# Patient Record
(demographics unavailable — no encounter records)

---

## 2024-11-18 NOTE — COUNSELING
[Fall prevention counseling provided] : Fall prevention counseling provided [Adequate lighting] : Adequate lighting [No throw rugs] : No throw rugs [Use proper foot wear] : Use proper foot wear [Sleep ___ hours/day] : Sleep [unfilled] hours/day [Engage in a relaxing activity] : Engage in a relaxing activity [Plan in advance] : Plan in advance [FreeTextEntry2] : bmi 23  weight 130

## 2024-11-18 NOTE — HEALTH RISK ASSESSMENT
[Very Good] : ~his/her~  mood as very good [No] : In the past 12 months have you used drugs other than those required for medical reasons? No [No falls in past year] : Patient reported no falls in the past year [Little interest or pleasure doing things] : 1) Little interest or pleasure doing things [Feeling down, depressed, or hopeless] : 2) Feeling down, depressed, or hopeless [0] : 2) Feeling down, depressed, or hopeless: Not at all (0) [PHQ-2 Negative - No further assessment needed] : PHQ-2 Negative - No further assessment needed [Former] : Former [NO] : No [Patient reported bone density results were normal] : Patient reported bone density results were normal [Patient declined colonoscopy] : Patient declined colonoscopy [HIV test declined] : HIV test declined [Hepatitis C test offered] : Hepatitis C test offered [Change in mental status noted] : Change in mental status noted [Language] : difficulty with language [None] : None [Alone] : lives alone [Retired] : retired [College] : College [] :  [# Of Children ___] : has [unfilled] children [Feels Safe at Home] : Feels safe at home [Fully functional (bathing, dressing, toileting, transferring, walking, feeding)] : Fully functional (bathing, dressing, toileting, transferring, walking, feeding) [Reports changes in hearing] : Reports changes in hearing [Smoke Detector] : smoke detector [Carbon Monoxide Detector] : carbon monoxide detector [Safety elements used in home] : safety elements used in home [Seat Belt] :  uses seat belt [Sunscreen] : uses sunscreen [FreeTextEntry1] : none  [de-identified] : exercises twice a week  [de-identified] : healthy diet   takes eunsure  when she doesnt have a meal  [[id number]] : 0 [de-identified] : stopped over 50 yrs ago  [Behavior] : denies difficulty with behavior [Learning/Retaining New Information] : denies difficulty learning/retaining new information [Handling Complex Tasks] : denies difficulty handling complex tasks [Reasoning] : denies difficulty with reasoning [Spatial Ability and Orientation] : denies difficulty with spatial ability and orientation [Sexually Active] : not sexually active [Reports changes in vision] : Reports no changes in vision [Reports changes in dental health] : Reports no changes in dental health [Travel to Developing Areas] : does not  travel to developing areas [TB Exposure] : is not being exposed to tuberculosis [Caregiver Concerns] : does not have caregiver concerns [BoneDensityDate] : 11/29/23 [de-identified] : decreased  [de-identified] : some college [de-identified] : needs help with  laundry house keeping  [de-identified] : wears hearing aide  [de-identified] : last eye exam this month  [de-identified] : last dental  few days ago  [AdvancecareDate] : 11/18/24

## 2024-11-18 NOTE — ASSESSMENT
[FreeTextEntry1] : health She needs flu vaccine covid vaccine   and is up to date with dental, eye exams bone density .  2 bmi 23 healthy eating  take ensure   3. dm  ---The following has been discussed:--- -Targets for weight and HgA1c have been discussed with patient  -FS goals have been reviewed with the patient in detail: AM <130 post meal<160-180 -Diet and weight goals have been discussed with the patient in detail. -The importance of exercise in the treatment of diabetes has been discussed  with the patient in detail. -Extensive dietary advice provided to patient and the need to avoid concentrated  sweets/simple carbohydrates and to ensure to consume protein with each meal.  -Patient instructed to limit carbohydrates to 60 gms per meal and 15 gms per  snacks.  -Patient to keep a blood sugar log to check fasting and before meals -Patient instructed on importance of daily feet inspection and to reports any  open lesions to physician promptly A diet that includes carbohydrates from fruits, vegetables, whole grains, legumes, and low-fat milk is encouraged. People with diabetes are advised to avoid sugar-sweetened beverages (including fruit juice).   The ideal amount of carbohydrate intake is uncertain. However, it's important for people with diabetes to monitor carbohydrate intake in order to manage their blood sugar levels and adjust insulin dosing as needed. (See 'Carbohydrate counting' above.)   ?In general, a variety of eating patterns (low fat, low carbohydrate, Mediterranean, vegetarian) are acceptable. Eating a healthy diet that contains a lot of the foods you like will make it easier to stick to your plan. However, you should talk to your health care provider before starting any diet that involves extreme restriction (such as a very low carb or "keto" diet). Depending on your situation, some diets may not be recommended.   ?The type of fat consumed appears to be more important than the amount of total fat. Saturated fats (eg, in meats, cheese, ice cream) can be replaced with monounsaturated and polyunsaturated fatty acids (eg, in fish, olive oil, nuts). Trans fatty acid consumption should be kept as low as possible. Trans fats are banned from processed foods in the [place]. Although very small amounts of trans fats are naturally present in meats, poultry and dairy products, the amount is too small for concern.   As diabetes increases your risk of heart disease and stroke, eating a diet low in saturated and trans fats and cholesterol can help to reduce your cholesterol levels and decrease these risks.   ?A dietitian can help you to determine how much protein your diet should include. In general, it's a good idea to get protein from lean meats, fish eggs, beans, soy, and nuts, and to limit the amount of red meat you eat.   ?Eating a diet that is high in fiber may help to keep your blood sugar levels under control.  ?A diet that is low in sodium and high in fruits, vegetables, and low-fat dairy products can help keep blood pressure under control.   ?Artificial sweeteners do not affect blood glucose levels and may be consumed in moderation. If you consume sugar-sweetened beverages regularly, a beverage containing artificial sweeteners (such as diet soda) can be a good short-term replacement strategy. However, the best approach is to avoid both sugar-sweetened and artificially sweetened beverages, and try to drink more water.   ?In the past, people with diabetes were told to avoid all foods with added sugar. This is no longer recommended, although it's important to limit sugar intake. If you take insulin, you should calculate each pre-meal dose based upon the total number of carbohydrates in the food, which includes the sugar content. (See 'Carbohydrate counting' above.)   ?Products that are "sugar-free" or "fat-free" do not necessarily have a reduced number of calories or carbohydrates. Read all nutrition labels carefully and compare with other similar products to determine which has the best balance of serving size and number of calories, carbohydrates, fat, and fiber.   Some sugar-free foods, such as sugar-free gelatin and sugar-free gum, do not have a significant number of calories or carbohydrates and are considered "free foods." Any food that has less than 20 calories and 5 grams of carbohydrate is considered a free food, meaning that it does not affect body weight or require an adjustment to your medication. 4. hypothyroid  check tsh  her heart rate is elevated  5 hld  to lower  LDL and non HDL  cholesterol levels     1 limit your intake of foods full of saturated fats  , trans fats, and dietary cholesterol .  Food with a lot of saturated fate include butter, fatty flesh like red meat, full fat and low fat dairy  products  , palm oil and coconut oil .   If you see partially hydrogenated fat in the ingredient  list of food label that food has trans fats.  Top sources of dietary chol include egg yolks , organ meats and shell fish.  one Type of fat omega 3 Fatty acids has been shown to protect against heart disease  . Good sources are cold water fish like salmon, mackerel , halibut ., trout  herring and sardines.     Limit  your intake of meat , poultry and fish to no more than 3.5  ounces per day     Eat a lot more fiber rich foods  like beans , oats, barley fruits and vegetables   .  Food naturally rich in soluble fiber  are good at lowering cholesterol .    Excellent choices  are  oats , oat bran , barley , peas , yams sweet potatoes and legumes  or beans .  Good fruit sources are berries passion fruit, oranges pears,, apricots , apples  and nectarines  .    choose protein rich plant foods   such as legumes or beans nuts and seeds over meat.     Lose as much weight as possible and exercise   Take plant sterol supplements   .A Daily intake  of 1-2 gms  of plant sterols  lowers ldl .    Best choice is supplements  such as Cholestoff  by natures made   because they dont have calories  sugar trans fats   an salt  of many foods enriched with plant sterols.    Take  Metamucil or psyllium   .   Studies have shown 9-10 gms as daily of psyllium   the equivalent of  about  3 teaspoons  of sugar free Metamucil   reduced LDL levels  . 6. diastolic dysfunciton refer to [facility] for fu  7 subarachnoid hemorrhagia  no recurrent neurological changes noted    8. hematuria  .fu ua   9 fall precaution Make your home safer  To avoid falling at home, get rid of things that might make you trip or slip. This might include furniture, electrical cords, clutter, and loose rugs Keep your home well-lit so that you can easily see where you are going. Avoid storing things in high places so you don't have to reach or climb. ?Wear sturdy shoes that fit well  Wearing shoes with high heels or slippery soles, or shoes that are too loose, can lead to falls. Walking around in bare feet, or only socks, can also increase your risk of falling. ?Take vitamin D pills  Taking vitamin D might lower the risk of falls in older people. This is because vitamin D helps make bones and muscles stronger. Your doctor can talk to you about whether you should take extra vitamin D, and how much. ?Stay active  Exercising on a regular basis can help lower your risk of falling. It might also help prevent you from getting hurt if you do fall. It is best to do a few different activities that help with both strength and balance. There are many kinds of exercise that can be safe for older people. These include walking, swimming, and Tom Chi (a Chinese martial art that involves slow, gentle movements). ?Use a cane, walker, and other safety devices  If your doctor recommends that you use a cane or walker, be sure that it's the right size and you know how to use it. There are other devices that might help you avoid falling, too. These include grab bars or a sturdy seat for the shower, non-slip bath mats, and hand rails or treads for the stairs (to prevent slipping). If you worry that you could fall, there are also alarm buttons that let you call for help if you fall and can't get up. start physical therapy.

## 2024-11-18 NOTE — PHYSICAL EXAM
[No Acute Distress] : no acute distress [Well Developed] : well developed [Well Nourished] : well nourished [PERRL] : pupils were equal in size, round, and reactive to light [Normal Oropharynx] : the oropharynx was normal [Normal Nasal Mucosa] : the nasal mucosa was normal [Normal Appearance] : was normal in appearance [None] : there were no thyroid nodules [JVP Elevated ___cm] : the JVP was not elevated [Rate ___] : at [unfilled] breaths per minute [Normal Rhythm/Effort] : normal respiratory rhythm and effort [Clear Bilaterally] : the lungs were clear to auscultation bilaterally [Normal to Percussion] : the lungs were normal to percussion [5th Left ICS - MCL] : palpated at the 5th LICS in the midclavicular line [Heart Rate ___] : [unfilled] bpm [Normal Rate] : normal [Normal S1] : normal S1 [Normal S2] : normal S2 [S3] : no S3 [S4] : no S4 [III] : a grade 3 [No Pitting Edema] : no pitting edema present [Rt] : no varicose veins of the right leg [Lt] : no varicose veins of the left leg [Right Carotid Bruit] : no bruit heard over the right carotid [Left Carotid Bruit] : no bruit heard over the left carotid [Right Femoral Bruit] : no bruit heard over the right femoral artery [Left Femoral Bruit] : no bruit heard over the left femoral artery [2+] : left 2+ [No Abnormalities] : the abdominal aorta was not enlarged and no bruit was heard [Bruit] : no bruit heard [Examination Of The Breasts] : a normal appearance [No Discharge] : no discharge [Soft, Nontender] : the abdomen was soft and nontender [No Mass] : no masses were palpated [No HSM] : no hepatosplenomegaly noted [Postauricular Lymph Nodes Enlarged Bilaterally] : nodes not enlarged [Preauricular Lymph Nodes Enlarged Bilaterally] : nodes not enlarged [Submandibular Lymph Nodes Enlarged Bilaterally] : nodes not enlarged [Suboccipital Lymph Nodes Enlarged Bilaterally] : nodes not enlarged [Submental Lymph Nodes Enlarged] : nodes not enlarged [Cervical Lymph Nodes Enlarged Posterior Bilaterally] : nodes not enlarged [Cervical Lymph Nodes Enlarged Anterior Bilaterally] : nodes not enlarged [Supraclavicular Lymph Nodes Enlarged Bilaterally] : nodes not enlarged [Axillary Lymph Nodes Enlarged Bilaterally] : nodes not enlarged [Epitrochlear Lymph Nodes Enlarged Bilaterally] : nodes not enlarged [Femoral Lymph Nodes Enlarged Bilaterally] : nodes not enlarged [Inguinal Lymph Nodes Enlarged Bilaterally] : nodes not enlarged [No Lymphangitis] : no lymphangitis observed [No Scoliosis] : no scoliosis [No Visual Abnormalities] : no visible abnormalities [Normal Lordosis] : normal lordosis [No Tenderness to Palpation] : no spine tenderness on palpation [No Masses] : no masses [Full ROM] : full ROM [No Pain with ROM] : no pain with motion in any direction [Intact] : all reflexes within normal limits bilaterally [Motor Strength Lower Extremities Left] : there was weakness of the left lower extremity [Normal Motor Tone] : the muscle tone was normal [Normal Scalp] : inspection of the scalp showed no abnormalities [Complexion Dark] : dark complexion [Tattoo - Single] : no tattoos observed [Limited Balance] : the patient's balance was impaired [Normal] : the deep tendon reflexes were normal [Normal Mental Status] : the patient's orientation, memory, attention, language and fund of knowledge were normal [Appropriate] : appropriate [Impaired judgment] : intact judgment [Impaired Insight] : intact insight [Comprehensive Foot Exam Normal] : Right and left foot were examined and both feet are normal. No ulcers in either foot. Toes are normal and with full ROM.  Normal tactile sensation with monofilament testing throughout both feet [de-identified] : front left lower tooth loose  gum recession and plque build up .  tongue normal   bilateral hearing aides  and removed for  evaluation.

## 2024-11-18 NOTE — HISTORY OF PRESENT ILLNESS
[FreeTextEntry1] : cpe  [de-identified] : Pt is a 86 yr  old woman with hld, hpn, glaucoma hypothyroid, dm left hip carola-prosthetic femur fracture, s/p ORIF on 8/2/19.  and arthritis who is here for her cpe.  Pt has hx of  left eye ptosis  due to  cerebral aneurysm and sp coiling in 2005.  she didnt bring in her medications.  I saw she has two rx for hctz one for 12.5 and one 25mg and is not sure which wone she takes.   she is taking her bp medication.   She -denies any headaches, nausea, vomiting, fever, chills, sweats, chest pain, chest pressure, diarrhea, constipation, dysphagia, sour taste in the mouth, dizziness, leg swelling, leg pain, myalgias, arthralgias, itchy eyes, itchy ears, heartburn, or reflux. She recently saw neuroligst and was advised to use her cane for walking and go to rehab for gait and balance .

## 2024-11-18 NOTE — PLAN
[FreeTextEntry1] : recommendations. pt states she checks her bp at home and its normal I have asked her to check 4 times a day chart and bring int to me  after 3weeks.

## 2025-03-27 NOTE — ASSESSMENT
[FreeTextEntry1] : 1 falls  Make your home safer  To avoid falling at home, get rid of things that might make you trip or slip. This might include furniture, electrical cords, clutter, and loose rugs Keep your home well-lit so that you can easily see where you are going. Avoid storing things in high places so you don't have to reach or climb. ?Wear sturdy shoes that fit well  Wearing shoes with high heels or slippery soles, or shoes that are too loose, can lead to falls. Walking around in bare feet, or only socks, can also increase your risk of falling. ?Take vitamin D pills  Taking vitamin D might lower the risk of falls in older people. This is because vitamin D helps make bones and muscles stronger. Your doctor can talk to you about whether you should take extra vitamin D, and how much. ?Stay active  Exercising on a regular basis can help lower your risk of falling. It might also help prevent you from getting hurt if you do fall. It is best to do a few different activities that help with both strength and balance. There are many kinds of exercise that can be safe for older people. These include walking, swimming, and Tom Chi (a Chinese martial art that involves slow, gentle movements). ?Use a cane, walker, and other safety devices  If your doctor recommends that you use a cane or walker, be sure that it's the right size and you know how to use it. There are other devices that might help you avoid falling, too. These include grab bars or a sturdy seat for the shower, non-slip bath mats, and hand rails or treads for the stairs (to prevent slipping). If you worry that you could fall, there are also alarm buttons that let you call for help if you fall and can't get up. 2 hpn    DIETARY SALT (SODIUM); DASH DIET AND BLOOD PRESSURE: To decrease the sodium in your diet:   Use fresh vegetables and foods as much as possible.  Avoid canned and processed foods. Cured meats such as son, ham, and sausages are high in salt.  Try using different herbs and spices in your cooking instead of salt.  In restaurants, avoid foods with sauces, cheese, and cured meats. Ask for low-sodium choices. To get more potassium in your diet, eat:  Bananas, fresh or dried apricots, peaches, citrus fruits, melons  Cauliflower, broccoli, tomatoes, carrots, raw spinach, beet greens, potatoes To get more magnesium in your diet, eat:  Whole grain foods, leafy green vegetables, dried fruits  Fish and seafood, poultry  To get more calcium in your diet, eat:  Nonfat milk, yogurt, and low-fat cheeses   [place] and sardines  Cooked dried beans  Broccoli, kale, and bok bob  Tofu or soybean curd DASH stands for "dietary approaches to stop hypertension." The DASH diet is low in total and saturated fat. It is rich in fruits, vegetables, and low-fat dairy foods. The diet allows you to get natural fiber, calcium, and magnesium from food. It prevents or lowers high blood pressure. It can also help lower cholesterol in your blood.  Don't change how you eat all at once. It's much more likely that you'll succeed if you make only one or two small changes at a time. Wait until those changes are a habit, then make a couple more changes. Some good starting steps include:  Add one serving of vegetables to your meals at lunch and dinner. This is an easy way to help you get more vegetables in your diet.  Have a piece of fruit as an afternoon or after-dinner snack. One glass of juice at breakfast is not enough fruit in your diet.  Use half your usual amount of butter, margarine, or salad dressing.  Buy nonfat salad dressing or nonfat sour cream. Follow this guide to select your menu of meals. The number of calories we want you to eat each day will tell you how many servings you can choose from each food group. Calories: 1600 2100 2600 3100 Servings Grains 6 7  10  12  Vegetables 	 4 4  5 6 Fruit 4 5 5 6 Dairy (low-fat) 2  3 3 3  Meat, poultry, fish  1  2 2  Nuts, seeds    1 Fats and sweets 1  2  3 4 Grains and grain products like breads and cereals provide energy, fiber and vitamins. Whole grains have more of these nutrients. One serving equals one of the following: Bagel, 1/2 medium; Barley, cooked 1/2 cup; Biscuit, country style 1 medium; Bread, whole wheat, white 1 slice; Cereals, cold or cooked, 1/2 cup; Cornbread, 1 medium piece; Crackers, phuong, 2; Crackers, saltine, 4; Dinner roll, 1medium; English muffin, ; Hamburger bun, ; Muffin, 1 medium; Pancake, 1 medium; Pasta, 1/2 cup cooked; Nga, 1/2 large or 1 small; Popcorn, 1 cup popped; Pretzels, 1 ounce; Rice, white, brown, or wild, 1/2 cup cooked; Tortillas, corn or flour, 1 medium; Waffle, 1 medium; Wheat germ, 1/4 cup;  Vegetables are rich sources of potassium, magnesium, and fiber. One serving is 1/2 cup of any of the following cooked vegetables: Asparagus, Beans (green, yellow), Beets, Broccoli, [place] Sprouts, Carrots, Cauliflower, [place], chicory, mustard and turnip (and other) greens, Corn, Kale, Lima beans, Mixed vegetables, Okra, Parsnips, Peas, green, Potatoes (1/2 medium or 1/2 cup mashed), Pumpkin, Rutabaga, Spaghetti or tomato sauce, Spinach, Squash (zucchini or yellow), Stewed tomatoes, Su[place]ot[place], Sweet potatoes, Turnips, Yam  Raw vegetables: Carrots,1/2 cup; Celery, 1/2 cup; Lettuce (alban, loose-leaf, green-leaf), 1 cup; Peppers, 1/2 cup; Spinach, 1 cup; Tomato, 1/2 Fruits and fruit juices are important sources of potassium and magnesium. Fruits also contain fiber and are low in sodium and fat. One serving equals: Any fruit juice, # cup (6 ounces); Canned or frozen fruit,  cup (includes applesauce); Dried fruit,  cup;  Fresh fruit: Apple, 1 medium; Apricots, 2 medium; Banana, 1 medium; Berries, 1/2 cup; Melon, 1 wedge, or 1/2 cup; Cherries, 10; Grapefruit, 1/2; Grapes, 15; Kiwi, 1 medium; [place], 1/2 small; Nectarine, 1 medium; Orange, 1 medium; Peach, 1 medium; Pear, 1 medium; Pineapple, 1/2 cup; Plums, 2 medium; Tangerine, 1 large Dairy foods provide protein and calcium. Use low-fat or nonfat dairy products to cut down on fat. One serving equals: Skim milk, 1 cup (8 ounces); 1% low fat milk, 1 cup (8 ounces); 2% low fat milk, 1 cup (8 ounces) nonfat dry milk powder (1/3 cup); Low-fat cottage cheese, 1 cup (8 ounces); Parmesan cheese, 1 tablespoon; Mozzarella cheese, part skim, 1/4 cup (1 ounce); Low-fat cheddar cheese, 11/2 ounces; Ricotta cheese, part skim milk or nonfat, 1/4 cup (11/2 ounces); Other low fat or nonfat cheeses (11/2 oz.); Low-fat or nonfat yogurt, fruit-flavored or plain, 1 cup (8 ounces) Low-fat or nonfat frozen yogurt, 1/2 cup (4 ounces); Note: People who can't digest dairy products can try taking lactase enzyme pills or drops (available at drug and grocery stores) when they eat dairy. There is also milk available with the enzyme already added. Or you can buy lactose-free milk. Meat, poultry, and fish are good sources of protein and magnesium. One serving equals: Lean meat including beef, veal, or pork, 3 ounces cooked; Skinless, white meat poultry including turkey, chicken, 3 ounces; Fish and shellfish, 3 ounces cooked; Low-fat luncheon meats, 1 ounce; Egg, 1 medium; Tofu, 3 ounces Note: Three ounces of cooked meat is about the size of a deck of cards. Nuts, seeds, and legumes are rich sources of energy, magnesium, potassium, protein and fiber. Nuts and seeds are also high in fat, so portions should be small. Almonds, 1/3 cup; Beans such as kidney, ruiz, and navy, 1/2 cup cooked; Chickpeas and lentils, 1/2 cup cooked; Cashews, 1/3 cup; Filberts, 1/3 cup; Mixed nuts, 1/3 cup; Peanut butter, 2 tablespoons; Peanuts, 1/3 cup; Sesame seeds, 2 tablespoons; Sunflower seeds, 2 tablespoons; Tofu, regular, 3 ounces; Walnuts, 1/3 cup  Following the above diet will give you about 27% fat in your diet. The goal is to have 30% or less of the calories you eat each day be from fat. To meet that goal, do not eat more than 2-3 servings daily of added fat. Also try to limit sweets. One serving equals: Butter or margarine, 1 teaspoon; Mayonnaise, 1 teaspoon; Low-fat mayonnaise, 1 tablespoon; Salad dressing, 1 tablespoon; Low-fat salad dressing, 2 tablespoons; Oil, 1 teaspoon (use olive, canola, safflower, or other vegetable oils); Candy, hard, 3 pieces; Jelly or jam, 1 tablespoon); Jell-O, 1/2 cup; Jelly beans, 1/2 ounce; Maple syrup, 1 tablespoon; Popsicle, 1; Sherbet or nonfat or low-fat frozen yogurt, 1/2 cup; Sugar, 1 tablespoon; Sugared lemonade or fruit punch, 1 cup (8 ounces); Note: Try diet fruit-flavored gelatin or frozen, canned, or fresh fruit for dessert.  Small amounts of alcohol may have benefits to the heart and blood pressure. However, excess use of alcohol can cause damage to the brain, liver and other organs. It can lead to high blood pressure. Drinking more than two drinks (15 ml) every day can raise your blood pressure. 15 ml of alcohol equals:   one 12-ounce bottle of beer   a half glass (5 ounces) of wine   1 ounce (one shot) of 100 proof hard liquor 3 hld  to lower  LDL and non HDL  cholesterol levels     1 limit your intake of foods full of saturated fats  , trans fats, and dietary cholesterol .  Food with a lot of saturated fate include butter, fatty flesh like red meat, full fat and low fat dairy  products  , palm oil and coconut oil .   If you see partially hydrogenated fat in the ingredient  list of food label that food has trans fats.  Top sources of dietary chol include egg yolks , organ meats and shell fish.  one Type of fat omega 3 Fatty acids has been shown to protect against heart disease  . Good sources are cold water fish like salmon, mackerel , halibut ., trout  herring and sardines.     Limit  your intake of meat , poultry and fish to no more than 3.5  ounces per day     Eat a lot more fiber rich foods  like beans , oats, barley fruits and vegetables   .  Food naturally rich in soluble fiber  are good at lowering cholesterol .    Excellent choices  are  oats , oat bran , barley , peas , yams sweet potatoes and legumes  or beans .  Good fruit sources are berries passion fruit, oranges pears,, apricots , apples  and nectarines  .    choose protein rich plant foods   such as legumes or beans nuts and seeds over meat.     Lose as much weight as possible and exercise   Take plant sterol supplements   .A Daily intake  of 1-2 gms  of plant sterols  lowers ldl .    Best choice is supplements  such as Cholestoff  by natures made   because they dont have calories  sugar trans fats   an salt  of many foods enriched with plant sterols.    Take  Metamucil or psyllium   .   Studies have shown 9-10 gms as daily of psyllium   the equivalent of  about  3 teaspoons  of sugar free Metamucil   reduced LDL levels  . chol  213 ldl 127 not optimal  4 dm  hgbA1c was 5.9  well controlled  ---The following has been discussed:--- -Targets for weight and HgA1c have been discussed with patient  -FS goals have been reviewed with the patient in detail: AM <130 post meal<160-180 -Diet and weight goals have been discussed with the patient in detail. -The importance of exercise in the treatment of diabetes has been discussed  with the patient in detail. -Extensive dietary advice provided to patient and the need to avoid concentrated  sweets/simple carbohydrates and to ensure to consume protein with each meal.  -Patient instructed to limit carbohydrates to 60 gms per meal and 15 gms per  snacks.  -Patient to keep a blood sugar log to check fasting and before meals -Patient instructed on importance of daily feet inspection and to reports any  open lesions to physician promptly 5 hypothyroid check her levels  6 diastolic dysfunction  fu with [facility]  7 sp subarachonoid hemorrhage  doing well   8 elevated alk phos repeat    9  urinary incontinence  check ua. she states she gets up twice at night to urinate no dyspuria

## 2025-03-27 NOTE — PHYSICAL EXAM
[No Acute Distress] : no acute distress [Normal Sclera/Conjunctiva] : normal sclera/conjunctiva [Normal Oropharynx] : the oropharynx was normal [Supple] : supple [Normal Rate] : normal rate  [Regular Rhythm] : with a regular rhythm [Normal S1, S2] : normal S1 and S2 [No Edema] : there was no peripheral edema [No Extremity Clubbing/Cyanosis] : no extremity clubbing/cyanosis [Normal Posterior Cervical Nodes] : no posterior cervical lymphadenopathy [Normal Anterior Cervical Nodes] : no anterior cervical lymphadenopathy [No CVA Tenderness] : no CVA  tenderness [Normal] : affect was normal and insight and judgment were intact [de-identified] : ptosis on left eye

## 2025-03-27 NOTE — HEALTH RISK ASSESSMENT
[No] : In the past 12 months have you used drugs other than those required for medical reasons? No [One fall no injury in past year] : Patient reported one fall in the past year without injury [Little interest or pleasure doing things] : 1) Little interest or pleasure doing things [Feeling down, depressed, or hopeless] : 2) Feeling down, depressed, or hopeless [0] : 2) Feeling down, depressed, or hopeless: Not at all (0) [PHQ-2 Negative - No further assessment needed] : PHQ-2 Negative - No further assessment needed [Former] : Former [de-identified] : [facility]  and er visist 3/10/25 [de-identified] : walks  [de-identified] : reg [[id number]] : 0 [de-identified] : stopped 70 yrs ago

## 2025-03-27 NOTE — HISTORY OF PRESENT ILLNESS
[FreeTextEntry1] : fu  [de-identified] : Pt is a 86 yr old woman who is here for fu after a fall.  She  has  hld, hpn, glaucoma hypothyroid, dm left hip carola-prosthetic femur fracture, s/p ORIF on 8/2/19. and arthritis .  She states her elevator was broken and had to walk down stairs  and and she missed a step and fell 4-5 steps  and didnt hit her head or lose consciousness and was taken to er by ambulance  [facility]. and took xrays and was negative and discharged.  She is feeling well  -denies any headaches, nausea, vomiting, fever, chills, sweats, chest pain, chest pressure, diarrhea, constipation, dysphagia, sour taste in the mouth, dizziness, leg swelling, leg pain, myalgias, arthralgias, itchy eyes, itchy ears, heartburn, or reflux. she saw [facility] in jan and added toprolxl 25mg  stop amlodipine  but pt states she didnt stop amlodipine and didnt take toprol.  She states she took the pill a few times and her bp was very high and called [facility] and was to continue with her old rx and she has been charting her bp  she is not taking metoprolol .  I reviewed her  bp and all were well controlled  Her heart rate today was  78 /min   I obtained knee xray  right and has chondromalacia patella degenerative narrowing of medial joint compartment  no fracture.

## 2025-03-27 NOTE — COUNSELING
[Fall prevention counseling provided] : Fall prevention counseling provided [Adequate lighting] : Adequate lighting [No throw rugs] : No throw rugs [Use proper foot wear] : Use proper foot wear [Sleep ___ hours/day] : Sleep [unfilled] hours/day [Engage in a relaxing activity] : Engage in a relaxing activity [Weigh Self Weekly] : weigh self weekly [____ min/wk Activity] : [unfilled] min/wk activity [FreeTextEntry2] : bmi 23 weight 132  [None] : None

## 2025-07-29 NOTE — COUNSELING
[Sleep ___ hours/day] : Sleep [unfilled] hours/day [Engage in a relaxing activity] : Engage in a relaxing activity [Plan in advance] : Plan in advance [FreeTextEntry2] : bmi 24  weight  138

## 2025-07-29 NOTE — REVIEW OF SYSTEMS
[Negative] : Heme/Lymph [Shortness Of Breath] : no shortness of breath [Wheezing] : no wheezing [Cough] : no cough [Dyspnea on Exertion] : no dyspnea on exertion [FreeTextEntry6] : hpi

## 2025-07-29 NOTE — HISTORY OF PRESENT ILLNESS
[FreeTextEntry1] : fu  [de-identified] : Pt is a 86 yr old woman who is here for fu after a fall. She has hld, hpn, glaucoma hypothyroid, dm left hip carola-prosthetic femur fracture, s/p ORIF on 8/2/19. and arthritis. she is feeling well and has gained  6lbs . she -denies any headaches, nausea, vomiting, fever, chills, sweats, chest pain, chest pressure, diarrhea, dysphagia, sour taste in the mouth, dizziness, leg swelling, leg pain, myalgias, arthralgias, itchy eyes, itchy ears, heartburn, or reflux. She has constipation and has a bm daily and takes a stool softener. pt has pain in her left knee intermittently   and walks with cane.   she doesnt go up and down stairs  and has it  when she walks.   She states she feels at times has pehlgm  and has to clear her throat   and at times light yellow.   and doesnt have cough or congestion and has ahppened for the summer.  she has allergies to cock roaches  dust mite

## 2025-07-29 NOTE — HEALTH RISK ASSESSMENT
[No] : In the past 12 months have you used drugs other than those required for medical reasons? No [No falls in past year] : Patient reported no falls in the past year [Little interest or pleasure doing things] : 1) Little interest or pleasure doing things [Feeling down, depressed, or hopeless] : 2) Feeling down, depressed, or hopeless [0] : 2) Feeling down, depressed, or hopeless: Not at all (0) [PHQ-2 Negative - No further assessment needed] : PHQ-2 Negative - No further assessment needed [Aggressive treatment] : aggressive treatment [Former] : Former [de-identified] : goes to day care twice a  week  [de-identified] : reg  [[id number]] : 0 [AdvancecareDate] : 7/29/25 [de-identified] : stopped 70 yrs ago

## 2025-07-29 NOTE — ASSESSMENT
[FreeTextEntry1] : 1 pnd   The symptoms of allergic rhinitis vary from person to person. Although the term "rhinitis" refers only to the nasal symptoms, many people also have symptoms that affect the eyes, throat, and ears. Sleep may be disrupted as well. Symptoms may include the following:  ?Nose  Watery nasal discharge, blocked nasal passages, sneezing, nasal itching, postnasal drip, loss of taste, facial pressure or pain   ?Eyes  Itchy red eyes, a feeling of grittiness in the eyes, swelling and dark discoloration of the skin below the eyes (called "allergic shiners") (see "Patient education: Allergic conjunctivitis (Beyond the Basics)")   ?Throat and ears  Sore throat, hoarse voice, congestion or popping of the ears, itching of the throat or ears   ?Sleep  Mouth breathing, frequent awakening, daytime fatigue, trouble doing normal activities (eg, work)   With perennial (year-round) allergic rhinitis, the predominant symptoms include postnasal drip, persistent nasal congestion, and trouble sleeping. The treatment of allergic rhinitis includes reducing exposure to allergens and other triggers in combination with medication therapy. In most people, this combined approach can effectively control symptoms.  Several different classes of drugs can treat the inflammation that causes symptoms of allergic rhinitis (table 1). These and other approaches, including nasal irrigation, allergy shots, or tablets that dissolve under the tongue, are discussed in the following section. The best treatment(s) will depend on your symptoms and personal preferences.  Avoiding the trigger(s)  Sometimes people can treat their allergic rhinitis simply by avoiding the things that trigger symptoms. For example, if a relative has cats that cause sneezing and itchy eyes, you can avoid being in the relative's house for extended periods of time or take an antihistamine several hours before visiting. However, most allergens are much harder to avoid. Trigger avoidance is discussed in more detail separately. (See "Patient education: Trigger avoidance in allergic rhinitis (Beyond the Basics)".)  Glucocorticoid nasal sprays  Nasal glucocorticoids (steroids) delivered by a nasal spray are the first-line treatment for the symptoms of allergic rhinitis. These drugs have few side effects and dramatically relieve symptoms in most people. Studies have shown that nasal steroids are more effective than oral antihistamines for symptom relief. (See 'Antihistamines' below.)  Some nasal steroids are available over-the-counter in the [place] (sample brand names: Flonase Sensimist, Flonase Allergy Relief, Rhinocort Allergy), while others require a prescription (table 1). These drugs differ with regard to the frequency of doses, the spray device, and cost, but all are similarly effective for treating all of the symptoms of allergic rhinitis.  If you have severe symptoms, you may need to use a nasal decongestant for a few days before starting a nasal steroid to reduce nasal swelling. This allows the nasal spray to reach more areas within your nasal passages. (See 'Decongestants' below.)  Some people notice symptom relief on the first day of treatment with nasal steroids, although it may take days to weeks to notice the full effect. For this reason, nasal steroids are most effective when used regularly. Some people are able to use lower doses when symptoms are less severe.  How to use a nasal spray  Nasal sprays work best when they are used properly and the medication remains in the nose rather than draining down the back of the throat. If your nose is crusted or contains mucus, you can clean it with a saline (salt water) nasal spray before using a nasal spray that contains medication.  Hold your head straight or with your chin slightly tucked. Point the spray away from the nasal septum (the cartilage that divides the two sides of the nose). After spraying, sniff gently to pull it into the higher parts of your nose. Avoid sniffing too hard, as this can result in the medicine draining down your throat.  Some people find that holding one nostril closed with a finger improves their ability to draw the spray into the upper nose. Spit out any medicine that drains into your throat, since it is not effective unless it remains in the nose.  Side effects  The side effects of nasal steroids are mild and may include a slightly unpleasant smell or taste or drying of the nasal lining. In some people, nasal steroids cause irritation, crusting, and bleeding of the nasal septum, especially during the winter. You can minimize these problems by reducing the dose of your nasal steroid, applying a moisturizing nasal gel or spray to the septum before using the spray, or switching to a water-based (rather than an alcohol-based) spray.  Studies suggest that nasal steroids are generally safe when used for many years. However, if you use these drugs for years, let your health care provider know. You should have periodic nasal examinations to check for rare side effects, such as nasal infection.  Some people wonder about the side effects of steroids. It is true that steroids taken in other forms (ie, as a pill or inhaled into the lungs) can have side effects, especially when taken for long periods of time. However, the doses used in nasal steroids are low and are not associated with these side effects. Still, clinicians usually recommend using the lowest effective dose.  Use in children  In children, using steroid nasal sprays for an extended time may slightly slow growth rate. If your child requires a nasal steroid spray for more than two months of the year, talk to his or her doctor or nurse for advice.  Antihistamines  Antihistamines relieve the itching, sneezing, and runny nose of allergic rhinitis, but they do not relieve nasal congestion. Using them along with nasal steroids or decongestants may provide greater symptom relief than using one of these medications alone.  Nonsedating oral antihistamines  Commonly used oral antihistamines include loratadine (sample brand names: Claritin, Alavert), desloratadine (brand name: Clarinex), cetirizine (sample brand name: Zyrtec), levocetirizine (brand name: Xyzal), and fexofenadine (sample brand name: Allegra) (table 1). Loratadine, cetirizine, and fexofenadine are available without a prescription and in long-acting (eg, 24-hour) formulas.  Sedating oral antihistamines  Several other antihistamines have been available for many years without a prescription, including brompheniramine (sample brand names: Dimetapp allergy, Nasahist B), chlorpheniramine (sample brand name: Chlor-Trimeton), diphenhydramine (sample brand name: Benadryl), and clemastine (sample brand name: Tavist). However, these drugs often cause sedation and should not be used before driving or operating machinery or in children. Even if you do not feel excessively drowsy after taking an antihistamine, these drugs can have a sedating effect, so it is important to be careful. Because of the side effects associated with the older antihistamines, the newer, nonsedating versions are typically preferred if an antihistamine is used.  Nasal sprays  Azelastine (brand names: Astelin, Astepro) and olopatadine (brand name: Patanase) are prescription nasal antihistamine sprays that can be used daily or when needed to relieve symptoms of postnasal drip, congestion, and sneezing. These sprays start to work within minutes after use. The most common side effect with azelastine is a bad taste in the mouth immediately after use. You can minimize this by keeping your head tilted forward while spraying to prevent the medicine from draining down your throat. (See 'How to use a nasal spray' above.)  Eye drops  If you have itching or irritation of the eyes with your other allergy symptoms, your doctor may suggest prescription or over-the-counter antihistamine eye drops. (See "Patient education: Allergic conjunctivitis (Beyond the Basics)".)  Combination glucocorticoid/antihistamine spray  A prescription combination of the nasal steroid fluticasone 2 knee pain  xray and refer to sports ortho  Activity limitations  To speed recovery and protect against future knee damage, activities that cause pain should be avoided temporarily.  If the knee is swollen or sore, the following positions and activities should be avoided until knee pain and swelling resolve:  ?Squatting  ?Kneeling  ?Twisting and pivoting  ?Jogging  ?Aerobics, dancing  ?Playing sports  ?Swimming using the frog or whip kick   The following types of exercise equipment should be avoided if the knee is swollen or sore, unless specifically prescribed by a physical therapist or physician:  ?Stair stepper  ?Rowing machine  ?Squats  ?Leg extensions   The preferred exercise equipment for the knee should provide smooth motion of the knee, strengthening of the front and back thigh muscles (quadriceps and hamstring muscles), minimal jarring and impact to the joint, and the least amount of bending necessary. These activities are acceptable alternatives to those listed above:  ?Fast walking  ?Water aerobics  ?Swimming using the crawl stroke  ?Cross country ski or elliptical-type machines  ?Soft platform treadmill   Ice and elevation  Ice is useful for the control of pain and swelling. It can be applied to the knee for 15 to 20 minutes as often as every two to four hours, particularly after physical activity. A bag of ice, frozen vegetables, or a frozen towel work well. The swollen knee should be elevated above the level of the heart while icing.  Pain relief  If needed, a non-prescription pain medication such as acetaminophen (Tylenol), ibuprofen (eg, Advil, Motrin) or naproxen (eg, Aleve) can be taken (see "Patient education: Nonsteroidal antiinflammatory drugs (NSAIDs) (Beyond the Basics)"). No more than 3000 mg of acetaminophen is recommended per day. Anyone with liver disease or heavy alcohol use should speak with his or her healthcare provider before taking acetaminophen. Individuals with a history of heart attack, stomach/intestinal bleeding, or kidney disease should speak with their health care provider before taking ibuprofen, naproxen, or other nonsteroidal antiinflammatory drugs (NSAIDs). Individuals taking more than one medicine to lower blood pressure should also be cautious about taking NSAIDs.  Stretching exercises  Depending upon the injury or condition causing pain, patients can begin stretching exercises as soon as the day following an injury. Stretch gently and gradually, holding consistent pressure at the end of the stretch. Avoid "bouncing" or rapid "ballistic" stretches as these can damage already injured tissue. Stretches should be held for 20 to 30 seconds and should be performed on both injured and uninjured legs. Each muscle should be stretched three to five times per session and stretching sessions may be performed one to four times each day.   Hamstring stretch  Sit on the floor or bed with the affected leg extended straight out in front of you. The opposite leg may be bent or may hang off the bed. Keeping the affected leg straight, lean forward and reach for the ankle. Hold for 30 seconds but do not bounce. Sit up straight. Repeat as above.  Quadriceps stretch  Stand behind a chair, holding the top of the chair with one hand. Bend the knee and grab the foot with the hand on the same side of the body. Stand up straight. Gently pull the foot towards the body. Hold for 30 seconds, holding constant pressure on the foot (do not pull-release-pull). Release the foot. Repeat 10 to 15 times.  Runner's stretch  Face a wall and stand 18 to 24 inches away. Place hands at head height and lean into the wall, keeping legs and back straight. You can rest your head on your hands, against the wall. You should feel a stretch in the muscles in the back of the calf. Hold for 30 seconds. Repeat 10 to 15 times.   Strengthening exercises  Rehabilitation of the knee almost always includes strengthening exercises. Patients gradually progress from exercises performed with a straight knee (eg, straight leg raises) to exercises that require some degree of knee bending (eg, squats). Initial exercises often include the following:  Straight leg raises  Sit on the edge of a chair or lie down on the back. Bend the opposite leg (picture 2). Keep the affected leg perfectly straight and raise it 3 to 4 inches off the ground. Hold for 5 seconds. Repeat 10 to 15 times (one set). Perform a total of three sets.  As your condition improves, perform straight leg raises with weights at the ankle; begin with a 2 pound weight and gradually increase to a 5 to 10 pound weight (pennies or fishing weights in an old sock, two cans in a purse, or Velcro ankle weights).  Hip abduction  Lie on your side on the bed or floor. The affected leg should be on top and should be held straight. The bottom leg should be bent. Hold the top leg straight and raise it 3 to 4 inches towards the ceiling. Hold for 5 seconds then slowly release. Repeat 10 to 15 times (one set). Perform a total of three sets.  Hip adduction  Lie on your side on the bed or floor. The affected leg should be on bottom and should be held straight. The top leg should be bent with the foot placed in front of the bottom leg. Lift the bottom leg 3 to 4 inches. Hold for 5 seconds then slowly release. Repeat 10 to 15 times (one set). Perform a total of three sets.  Quarter squats  Stand 18 to 24 inches from a wall. Lean back against the wall. Bend both knees slightly (the buttocks should not be lower than the knees), keeping the back straight (picture 3). Hold for five seconds then slowly stand up straight. Rest as needed. Repeat 10 to 15 times (one set). Perform a total of three sets. To increase the difficulty, bend the knees more deeply, hold for a longer time, and increase the speed.  Alternately, use an exercise ball to perform squats. Stand up straight, holding the ball between your back and the wall. Slowly bend the knees and lower the back (roll the ball down the wall). Hold for a count of five. Stand up. Repeat 10 to 15 times. 3. hld  to lower  LDL and non HDL  cholesterol levels     1 limit your intake of foods full of saturated fats  , trans fats, and dietary cholesterol .  Food with a lot of saturated fate include butter, fatty flesh like red meat, full fat and low fat dairy  products  , palm oil and coconut oil .   If you see partially hydrogenated fat in the ingredient  list of food label that food has trans fats.  Top sources of dietary chol include egg yolks , organ meats and shell fish.  one Type of fat omega 3 Fatty acids has been shown to protect against heart disease  . Good sources are cold water fish like salmon, mackerel , halibut ., trout  herring and sardines.     Limit  your intake of meat , poultry and fish to no more than 3.5  ounces per day     Eat a lot more fiber rich foods  like beans , oats, barley fruits and vegetables   .  Food naturally rich in soluble fiber  are good at lowering cholesterol .    Excellent choices  are  oats , oat bran , barley , peas , yams sweet potatoes and legumes  or beans .  Good fruit sources are berries passion fruit, oranges pears,, apricots , apples  and nectarines  .    choose protein rich plant foods   such as legumes or beans nuts and seeds over meat.     Lose as much weight as possible and exercise   Take plant sterol supplements   .A Daily intake  of 1-2 gms  of plant sterols  lowers ldl .    Best choice is supplements  such as Cholestoff  by natures made   because they dont have calories  sugar trans fats   an salt  of many foods enriched with plant sterols.    Take  Metamucil or psyllium   .   Studies have shown 9-10 gms as daily of psyllium   the equivalent of  about  3 teaspoons  of sugar free Metamucil   reduced LDL levels  . 4 hhyperglcymeia  GLYCEMIC INDEX: Foods can be measured by how much they are likely to raise blood sugar. This is called the glycemic index. We want you to eat mostly foods that have a low glycemic index.  Fruit: choose cherries, grapefruit, prunes, dried apricots, apples, peaches, pears, blueberries, strawberries, oranges, and grapes.  Breads and other starches: Choose pumpernickel, rye, sourdough, or stone-ground whole wheat bread. For cereal, choose bran flakes, oat flakes, and oatmeal. For rice, use long-grained white rice. Most pasta is fairly low on the glycemic index; whole wheat or egg pasta is the lowest. Choose new or sweet potatoes and yams.  Dairy products: Dairy tends to be fairly low on the glycemic index because of the protein and fat in it. Premium ice cream is lower on the glycemic index than low-fat ice cream.  Salty snacks: Hummus, peanuts, walnuts, and cashews are all good choices.  Sweets: Most sweets are high on the glycemic index, so make them special treats only. Ones that have protein and fat in them tend to be a bit lower. Milk chocolate or peanut candies are good choices. Pound and sponge cakes are lower on the glycemic index than other types of cakes. For cookies, choose peanut butter, chocolate chip, butter, and oatmeal cookies. For a breakfast treat, choose scones or oatmeal muffins.  Beans: Choose warren, chickpeas (garbanzo beans), lentils, split peas, lima beans, and kidney beans.  Meat and vegetables are low on the glycemic index. Soups and stews made with meat or vegetables are also good.  5. hpn  controlled  6 dry skin  lotion given   avoid hot showers  sun exposure.  7. dilated pancreatic duct   fu [facility] in Oct  8 complexed renal cyst  fu  oct [facility].

## 2025-07-29 NOTE — PHYSICAL EXAM
[Normal Sclera/Conjunctiva] : normal sclera/conjunctiva [Normal Oropharynx] : the oropharynx was normal [Rate ___] : at [unfilled] breaths per minute [Normal Rhythm/Effort] : normal respiratory rhythm and effort [Clear Bilaterally] : the lungs were clear to auscultation bilaterally [Normal to Percussion] : the lungs were normal to percussion [Normal Rate] : normal rate  [Regular Rhythm] : with a regular rhythm [Normal S1, S2] : normal S1 and S2 [No Edema] : there was no peripheral edema [Normal Posterior Cervical Nodes] : no posterior cervical lymphadenopathy [Normal Anterior Cervical Nodes] : no anterior cervical lymphadenopathy [No CVA Tenderness] : no CVA  tenderness [Normal Motor Tone] : the muscle tone was normal [Normal Scalp] : inspection of the scalp showed no abnormalities [Examination Of The Hair] : texture and distribution of hair was normal [Complexion Dark] : dark complexion [Coordination Grossly Intact] : coordination grossly intact [Normal] : affect was normal and insight and judgment were intact [Tattoo - Single] : no tattoos observed [de-identified] : ptosis on left eye  [de-identified] : nasal mucosa erythematous  post nasal drip  [de-identified] : grade 2/6 systolic murmur [facility] 2nd ics [de-identified] : with cane